# Patient Record
Sex: MALE | Race: WHITE | URBAN - METROPOLITAN AREA
[De-identification: names, ages, dates, MRNs, and addresses within clinical notes are randomized per-mention and may not be internally consistent; named-entity substitution may affect disease eponyms.]

---

## 2024-01-19 ENCOUNTER — HOME HEALTH ADMISSION (OUTPATIENT)
Dept: HOME HEALTH SERVICES | Facility: HOME HEALTH | Age: 84
End: 2024-01-19
Payer: COMMERCIAL

## 2024-01-21 ENCOUNTER — HOME CARE VISIT (OUTPATIENT)
Dept: SCHEDULING | Facility: HOME HEALTH | Age: 84
End: 2024-01-21
Payer: COMMERCIAL

## 2024-01-21 VITALS
RESPIRATION RATE: 18 BRPM | DIASTOLIC BLOOD PRESSURE: 88 MMHG | HEART RATE: 80 BPM | OXYGEN SATURATION: 5 % | SYSTOLIC BLOOD PRESSURE: 112 MMHG | TEMPERATURE: 97.5 F

## 2024-01-21 PROCEDURE — G0299 HHS/HOSPICE OF RN EA 15 MIN: HCPCS

## 2024-01-23 ENCOUNTER — HOME CARE VISIT (OUTPATIENT)
Dept: SCHEDULING | Facility: HOME HEALTH | Age: 84
End: 2024-01-23
Payer: COMMERCIAL

## 2024-01-23 VITALS
HEART RATE: 64 BPM | OXYGEN SATURATION: 93 % | DIASTOLIC BLOOD PRESSURE: 62 MMHG | TEMPERATURE: 97.5 F | SYSTOLIC BLOOD PRESSURE: 112 MMHG | RESPIRATION RATE: 18 BRPM

## 2024-01-23 PROCEDURE — G0300 HHS/HOSPICE OF LPN EA 15 MIN: HCPCS

## 2024-01-23 ASSESSMENT — ENCOUNTER SYMPTOMS: HEMOPTYSIS: 0

## 2024-01-23 NOTE — HOME HEALTH
SN visit, wife present during visit, pt is A & O x3, forgetful, pleasant and cooperative, pt had his nephrostomy tube replaced yesterday, no new orders received, it is patent, draining dark, yellow urine w/o difficulty, left flank insertion site with no redness, swelling, pain, no drainage, dsg dry and intact, wife changed it this morning, indwelling f/c patent, draining pink urine, cath was changed on 1/16/24 at the rehab center, latest lab work show high calcium levels, dr instructed pt to drink more water and repeat blood work on 1/30/24,  instructed pt on safety precautions, proper nutrition, increase fluid intake to prevent uti's, pt voiced understanding using the teach back method.

## 2024-01-25 ENCOUNTER — HOME CARE VISIT (OUTPATIENT)
Dept: SCHEDULING | Facility: HOME HEALTH | Age: 84
End: 2024-01-25
Payer: COMMERCIAL

## 2024-01-25 VITALS
RESPIRATION RATE: 18 BRPM | TEMPERATURE: 97 F | HEART RATE: 64 BPM | SYSTOLIC BLOOD PRESSURE: 100 MMHG | OXYGEN SATURATION: 95 % | DIASTOLIC BLOOD PRESSURE: 70 MMHG

## 2024-01-25 PROCEDURE — G0152 HHCP-SERV OF OT,EA 15 MIN: HCPCS

## 2024-01-25 PROCEDURE — G0151 HHCP-SERV OF PT,EA 15 MIN: HCPCS

## 2024-01-25 ASSESSMENT — ENCOUNTER SYMPTOMS
PAIN LOCATION - PAIN QUALITY: ACHE
CONSTIPATION: 1

## 2024-01-25 NOTE — HOME HEALTH
This patient has answered NO to the following questions:   1) have you traveled outside the country   2) have you been exposed to or been in contact with anyone whom traveled outside the country in the past two weeks   3) do you have a sore throat/fever/dry cough      4)The patient has been educated on and demonstrates good understanding via the teach-back method for the following: Signs and symptoms of COVID-19 yes    Pt is an 83 year old male who is living with son and daughter in law right now, lives in [place] otherwise with wife.   Pt referred for therapy due to relapse of prostate cancer, had radiation 12 years ago initially.   Pt had further radiation more recently, came to [place] to go to [place] 6 months ago for a second opinion for him and his wife who also is being treated for breast cancer at this time.   Pt recently hospitalized around one month ago due to UTI and had pacemaker placed.  Pt followed with rehab, returned to pt's son home one week ago.  Pt currently ambulates in home with walker and performs functional transfers with min assist for safety, recommended raised commode with bars for increased safety. Pt requires min assist for ADLs, wife providing assist for same and family for meals. Shower does not have grab bars due to living at son's home.  Pt had blood in hickman catheter today but states that sometimes happens and it flushes away. OT contacted office to report same who will follow up [facility] nurse .   Pt demo's bilat UE strength 3+/5 for transfers, decreased endurance.  Pt demo's decresaed standing tolerance/balance requiring frequent rest periods with ADL's and IADL's and UE support at all times.      Pt would benefit with OT to increase safety and indep with ADL's, IADL's, increase bilat UE strength for functional transfers, adaptive equipment recommendations and training, increasing standing tolerance/balance for safety with ADL's with fewer rest periods.     Pt's goal is to increase

## 2024-01-26 VITALS
DIASTOLIC BLOOD PRESSURE: 79 MMHG | HEART RATE: 81 BPM | RESPIRATION RATE: 16 BRPM | SYSTOLIC BLOOD PRESSURE: 138 MMHG | TEMPERATURE: 98.1 F

## 2024-01-27 NOTE — HOME HEALTH
The patient, an 83-year-old male, presents with a recent diagnosis of prostate cancer and a concurrent urinary tract infection (UTI). The patient reports a decline in overall physical condition, expressing the need for assistance in performing functional mobility tasks. Complaints of an unsteady gait, difficulty with activities of daily living (ADLs), and reduced functional and community ambulation, utilizing a 4-wheel walker (4WW), are noted.    Objective:    General appearance: The patient appears fatigued and demonstrates guarded movements.  Musculoskeletal: Evidence of deconditioning with reduced muscle strength and endurance, BLE muscle strength: 3-/5, BLE ROM : WNL  Neurological: Unsteady gait observed during ambulation, contributing to a high fall risk based on low Timed Up and Go (TUG), Altamirano, and Tinetti scores.  Assessment:    Deconditioning following the recent diagnosis of prostate cancer and concurrent UTI.  Need for assistance in functional mobility due to generalized weakness and an unsteady gait.  High fall risk indicated by low TUG, Altamirano, and Tinetti scores.  Difficulty with ADLs and reduced functional and community ambulation using a 4WW.  Plan:  Gait and Balance Training:  Initiate physical therapy interventions for gait training and balance exercises, emphasizing fall prevention strategies.  Utilize the 4WW as needed, focusing on proper use and mobility.    Home Safety Assessment:    Conduct a comprehensive home assessment to identify and rectify potential fall hazards.  Educate the patient and caregivers on home safety measures.

## 2024-01-29 ENCOUNTER — HOME CARE VISIT (OUTPATIENT)
Dept: SCHEDULING | Facility: HOME HEALTH | Age: 84
End: 2024-01-29
Payer: COMMERCIAL

## 2024-01-29 VITALS
OXYGEN SATURATION: 95 % | HEART RATE: 59 BPM | DIASTOLIC BLOOD PRESSURE: 63 MMHG | TEMPERATURE: 97.1 F | RESPIRATION RATE: 18 BRPM | SYSTOLIC BLOOD PRESSURE: 117 MMHG

## 2024-01-29 PROCEDURE — G0152 HHCP-SERV OF OT,EA 15 MIN: HCPCS

## 2024-01-29 NOTE — HOME HEALTH
This patient has answered NO to the following questions:   1) have you traveled outside the country   2) have you been exposed to or been in contact with anyone whom traveled outside the country in the past two weeks   3) do you have a sore throat/fever/dry cough      4)The patient has been educated on and demonstrates good understanding via the teach-back method for the following: Signs and symptoms of COVID-19 yes    Pt reports his catheter is not draining into bag , OT contacted office reporting same for nurse to address.   Pt instructed in standing tolerance/balance with UE funcitonal reaching, endurance with UB activity to complete ADLs with fewer rest periods.Pt tolerated5 mins standing UB activity prior to rest required with no SOB noteed . Instructed in energy conservation with ADLs and IADLs with taking rest periods, breathing, alternating rest and activity to reduce fatigue.   Instructed pt in right UE strengthening ( pacemaker recently placed in left side so no resistance to left UE at this time ),  in unsupported sitting to increase core strength for sitting ADLs, posture exercises and deep breathing. OT contacted Dr. Gurjit Stacy's  office and spoke to DJ regarding restrictions on left UE due to pacemaker. She reports no reaching out to side or over head for 6 weeks but will send note to MD confirming further restrictions.   Pt tolerates 10-15 reps to shoulders, elbows,   in unsupported sitting to increase core and trunk control for sitting ADL's.  Pt requires mod  VC for proper form, breathing, pacing and slowing exercises down after teach back method  Instructed pt in safe shower transfer recommending grab bars but it is pt's son/s home. Pt is now using shower chair. Pt's wife is SBA for bathing for safety and OT recommends same.   Pt progressing well towards OT goals  Discussed DC plan  with pt and caregiver, plan to DC when goals met  Plan to continue increasing UE strength, ADL's, safety with

## 2024-01-31 ENCOUNTER — HOME CARE VISIT (OUTPATIENT)
Dept: SCHEDULING | Facility: HOME HEALTH | Age: 84
End: 2024-01-31
Payer: COMMERCIAL

## 2024-01-31 VITALS
HEART RATE: 64 BPM | DIASTOLIC BLOOD PRESSURE: 64 MMHG | OXYGEN SATURATION: 96 % | RESPIRATION RATE: 18 BRPM | TEMPERATURE: 98.3 F | SYSTOLIC BLOOD PRESSURE: 128 MMHG

## 2024-01-31 PROCEDURE — G0300 HHS/HOSPICE OF LPN EA 15 MIN: HCPCS

## 2024-01-31 ASSESSMENT — ENCOUNTER SYMPTOMS: HEMOPTYSIS: 0

## 2024-01-31 NOTE — HOME HEALTH
Patient with left flank nephrostomy tube and indwelling f/c d/t prostate cancer. Pt is moving back to [place], [place] in 2days, requesting to be discharged from agency  Left flank nephrostomy tube insertion dsg dry and clean, no redness, no swelling, wife changes the dressing, f/c patent, draining pink color urine, pt denies pain  Caregiver involvement: pt lives with wife  Medications reconciled and all medications are available in the home  Home health supplies by type and quantity ordered/delivered this visit include: n/a  Patient education provided this visit: SN educated patient and patient's caregiver to increase fluid intake, proper nutrition, increase activity as tolerated.  Patient and patient caregiver verbalizes understanding via the teach back method.   Progress toward goals: progressing well  Home exercise program: PT/OT  Plan for next visit: discharge from agency  The following discharge planning was discussed with the patient/ patient's caregiver: DC when goals met

## 2024-02-01 ENCOUNTER — HOME CARE VISIT (OUTPATIENT)
Dept: HOME HEALTH SERVICES | Facility: HOME HEALTH | Age: 84
End: 2024-02-01
Payer: COMMERCIAL

## 2024-02-01 NOTE — HOME HEALTH
The patient missed a routine visit for physical therapy) on 1/31/2024  due to pt requesting to be discharged.  The [facility] office was not contacted via phone but the MISSED VISIT note is being sent via fax/mail and is recorded in the MEDIA tab.

## 2024-02-01 NOTE — HOME HEALTH
This patient has answered NO to the following questions:   1) have you traveled outside the country   2) have you been exposed to or been in contact with anyone whom traveled outside the country in the past two weeks   3) do you have a sore throat/fever/dry cough      4)The patient has been educated on and demonstrates good understanding via the teach-back method for the following: Signs and symptoms of COVID-19 yes    Pt was seen for OT evaluation and one treatment where HEP was initiated, instructed in safe functional transfers and ADL's, adaptive equipment training and recommendations, standing tolerance/balance for ADLs.  Pt and his wife are moving back to [facility] from home health services at this time.

## 2024-02-02 ENCOUNTER — HOME CARE VISIT (OUTPATIENT)
Dept: HOME HEALTH SERVICES | Facility: HOME HEALTH | Age: 84
End: 2024-02-02
Payer: COMMERCIAL

## 2024-02-05 ENCOUNTER — HOME CARE VISIT (OUTPATIENT)
Dept: HOME HEALTH SERVICES | Facility: HOME HEALTH | Age: 84
End: 2024-02-05
Payer: COMMERCIAL

## 2024-02-05 ASSESSMENT — ENCOUNTER SYMPTOMS: DYSPNEA ACTIVITY LEVEL: AFTER AMBULATING MORE THAN 20 FT

## 2024-02-07 NOTE — HOME HEALTH
The patient missed a routine visit for physical therapy) on 2/2/2024  due to pt requesting to be discharged from therapy.  The [facility] office was not contacted via phone but the MISSED VISIT note is being sent via fax/mail and is recorded in the MEDIA tab.

## 2024-02-17 ENCOUNTER — HOME CARE VISIT (OUTPATIENT)
Dept: HOME HEALTH SERVICES | Facility: HOME HEALTH | Age: 84
End: 2024-02-17
Payer: COMMERCIAL